# Patient Record
Sex: FEMALE | Race: WHITE
[De-identification: names, ages, dates, MRNs, and addresses within clinical notes are randomized per-mention and may not be internally consistent; named-entity substitution may affect disease eponyms.]

---

## 2021-02-25 VITALS — DIASTOLIC BLOOD PRESSURE: 86 MMHG | WEIGHT: 210 LBS | SYSTOLIC BLOOD PRESSURE: 146 MMHG | BODY MASS INDEX: 37.2 KG/M2

## 2021-10-08 PROBLEM — Z00.00 ENCOUNTER FOR PREVENTIVE HEALTH EXAMINATION: Status: ACTIVE | Noted: 2021-10-08

## 2021-10-21 ENCOUNTER — APPOINTMENT (OUTPATIENT)
Dept: BREAST CENTER | Facility: CLINIC | Age: 66
End: 2021-10-21
Payer: MEDICARE

## 2021-10-21 VITALS
WEIGHT: 190 LBS | DIASTOLIC BLOOD PRESSURE: 82 MMHG | BODY MASS INDEX: 33.66 KG/M2 | HEIGHT: 63 IN | TEMPERATURE: 97.1 F | SYSTOLIC BLOOD PRESSURE: 134 MMHG

## 2021-10-21 DIAGNOSIS — Z78.9 OTHER SPECIFIED HEALTH STATUS: ICD-10-CM

## 2021-10-21 PROCEDURE — 99203 OFFICE O/P NEW LOW 30 MIN: CPT

## 2021-10-21 NOTE — PAST MEDICAL HISTORY
[Menarche Age ____] : age at menarche was [unfilled] [Total Preg ___] : G[unfilled] [Live Births ___] : P[unfilled]  [Age At Live Birth ___] : Age at live birth: [unfilled] [FreeTextEntry7] : yes

## 2021-10-21 NOTE — HISTORY OF PRESENT ILLNESS
[FreeTextEntry1] : Joanna is 66 year old female here for evaluation of Left intraductal papilloma.\par \par At the time of imaging, she was experiencing right arm/UOQ pain; had a few episodes of clear nipple discharge.\par She has no new breast related complaints at this time.  She denies any breast pain, has not palpated any new palpable masses in either breast.\par \par Her imaging work-up was as follows:\par 2021 - B/L Dx Mammo & Sono:\par - Heterogeneously dense\par -oval focal asymmetry in the LEFT breast @ 6:00 axis measuring 8 mm. It correlates with nodule visualized on   ultrasound. \par -@6N1 hypoechoic nodule possibly associated with a duct in the LEFT breast measuring 8 x 5 x 5 mm. It    correlates with a focal asymmetry visualized mammographically-->Ultrasound-guided core biopsy \par BIRADS4\par \par 2021 - US Guided Core Bx:\par Left, 6:00 N1, 8mm: (cork).\par - *Intraductal papilloma* , fragments\par \par \par HISTORIC RISK FACTORS\par -no hx of breast surgery or previous bx\par -no fam hx of breast or ovarian cancer \par - age at first born 41\par - OCP use - 10 years.\par -pt worked at [employer] - 1 year post .\par \par

## 2021-10-21 NOTE — ASSESSMENT
[FreeTextEntry1] : Joanna is 66 year old female here for her new dx of intraductal papilloma \par \par On exam, I was not able to palpate any suspicious abnormalities but she did have clear nipple discharge from a left nipple duct @5:00.\par \par Her most recent imaging was a b/l dx mammogram and US on 7/14/2021 which revealed @6N1, a hypoechoic nodule measuring 8 x 5 x 5 mm which was her biopsy proven intraductal papilloma. \par \par Intraductal papillomas without atypia are considered fibroproliferative lesions without atypia.  Patients with these lesions were found to have a slightly increased relative risk of breast cancer compared to the reference population.  However the lesions themselves do not have any malignant potential. \par \par Although newer studies regarding the upgrade rate (to cancer) ranges from 0-14% on surgical excision, with pathologic/radiologic concordance, older studies found a higher upgrade rate.  I have recommended surgical excision for this reason.  Because it is not readily palpable, I will have her undergo a preoperative radiofrequency tag localization.  \par \par The risks and benefits of the procedure were explained to the patient, including bleeding, infection, seroma/hematoma formation, and possible re-operation if the surgical excision yields an upgrade to cancer with positive margins. Informed consent was obtained today.\par \par We discussed dense breasts.  Increasing breast density has been found to increase ones risk of breast cancer, but at this time, there is no clear indication for additional imaging in this setting, as both US and MRI have not been found to improve survival.  One can consider bilateral screening US.  However, out of 1000 women screened, the use of routine US will only identify an additional 3-5 cancers.  The use of US was found to increase the likelihood of undergoing more imaging and more biopsies.  She does have dense breasts.  We have decided to proceed with screening bilateral breast US at this time.  This will be scheduled with her next screening mammogram.\par \par She is otherwise at an average risk for breast cancer and should continue with annual screening mammograms and US. \par \par \par PLAN:\par -OR: LEFT BREAST WIDE LOCAL EXCISION WITH RF LOCALIZATION \par -DIAGNOSIS: LEFT INTRADUCTAL PAPILLOMA \par -f/up after

## 2021-10-21 NOTE — PHYSICAL EXAM
[No dominant masses] : no dominant masses in right breast  [No dominant masses] : no dominant masses left breast [No Nipple Retraction] : no left nipple retraction [No Nipple Discharge] : no right nipple discharge [No Axillary Lymphadenopathy] : no left axillary lymphadenopathy [Soft] : abdomen soft [Not Tender] : non-tender [No Edema] : no edema [No Rashes] : no rashes [No Ulceration] : no ulceration [de-identified] : no suspicious masses were palpated within either breast  [de-identified] : @5:00 nipple duct, I was able to express clear nipple discharge

## 2021-10-21 NOTE — REVIEW OF SYSTEMS
[As Noted in HPI] : as noted in HPI [Nipple Discharge] : nipple discharge [Negative] : Constitutional [Breast Pain] : no breast pain [Breast Lump] : no breast lump [Nipple Inverted] : no inversion of the nipple

## 2021-10-21 NOTE — DATA REVIEWED
[FreeTextEntry1] :  \par Jul 14, 2021  \par MAMMO TOMOSYNTHESIS DIAGNOSTIC BILATERAL, US BREAST COMPLETE BILATERAL\par \par  \par \par Clinical Breast Exam: Patient does report clinical breast exam in the last year.\par \par  \par \par Clinical Indication: Patient complains of focal right breast pain. Patient has clear nipple discharge from the left breast, not bloody. No family history of breast cancer.\par \par  \par \par  \par \par  \par \par MAMMOGRAM: \par \par  \par \par Tomosynthesis and 2D imaging of the breast(s) were performed.  Current study was also evaluated with a computer aided detection (CAD) system.\par \par  \par \par Breast Density: Heterogeneously dense, which may obscure small masses.\par \par  \par \par There is an oval focal asymmetry in the left breast at approximately 6:00 axis measuring 8 mm. It correlates with nodule visualized on ultrasound. No other significant masses, calcifications, or other findings are seen in either breast.\par \par  \par \par US BREAST COMPLETE BILATERAL\par \par  \par \par Ultrasound evaluation was performed including examination of all four quadrants of the breast(s) and the retroareolar regions.\par \par  \par \par Color flow, Gray scale and real-time ultrasound of both breasts was performed. \par \par  \par \par There is a hypoechoic nodule possibly associated with a duct in the left breast at 6:00 axis at 1 cm from the nipple measuring 8 x 5 x 5 mm. It correlates with a focal asymmetry visualized mammographically. Ultrasound-guided core biopsy is advised for further evaluation. No other findings were seen sonographically in either breast.\par \par  \par \par Electronic Signature: I personally reviewed the images and agree with this report. Final Report: Dictated by  and Signed by Attending Cecilia Banks MD 7/14/2021 3:03 PM\par \par  \par \par OVERALL IMPRESSION: \par \par  \par \par Left breast nodule.  Ultrasound guided core biopsy is advised.\par \par  \par \par Biopsy of the left breast(s) is recommended. A letter will be sent to the patient to return for a biopsy.\par \par  \par \par The findings and recommendations were discussed with the patient.\par \par  \par \par BI-RADS 4: SUSPICIOUS\par \par   \par \par Jul 22, 2021  3:29 [phone]  \par \par \par \par \par \par \par \par Addendum\par \par \par  \par \par \par Pathology Report Received From [facility]:\par \par  \par \par The pathology report of the left breast ultrasound-guided core biopsy dated 7/22/2021 indicated that the target at 6:00 axis is HIGH RISK \par \par  \par \par  \par \par Final Diagnosis \par \par A. Breast, left, 6 o'clock, 1 cm FN, ultrasound (US) guided biopsy: \par \par - *Intraductal papilloma* , fragments\par \par  \par \par  \par \par  \par \par The pathology results are concordant with the imaging findings.\par \par  \par \par The patient has been notified of these results on 7/28/2021 at 12:16 PM. She has been advised to contact your office and to arrange for SURGICAL CONSULTATION to discuss management options.\par \par  \par \par Surgical consultation of the left breast(s) is recommended. _\par \par  \par \par Electronic Signature: I personally reviewed the images and agree with this report. Final Report: Dictated by  and Signed by Attending Cecilia Banks MD 7/28/2021 12:19 PM \par \par \par Addended by Cecilia Villa MD on 7/28/2021 12:19 PM \par \par \par \par \par Study Result\par \par \par \par Addenda \par \par \par \par \par Pathology Report Received From [facility]:\par \par  \par \par The pathology report of the left breast ultrasound-guided core biopsy dated 7/22/2021 indicated that the target at 6:00 axis is HIGH RISK \par \par  \par \par  \par \par Final Diagnosis \par \par A. Breast, left, 6 o'clock, 1 cm FN, ultrasound (US) guided biopsy: \par \par - *Intraductal papilloma* , fragments\par \par  \par \par  \par \par  \par \par The pathology results are concordant with the imaging findings.\par \par  \par \par The patient has been notified of these results on 7/28/2021 at 12:16 PM. She has been advised to contact your office and to arrange for SURGICAL CONSULTATION to discuss management options.\par \par  \par \par Surgical consultation of the left breast(s) is recommended. _\par \par  \par \par Electronic Signature: I personally reviewed the images and agree with this report. Final Report: Dictated by  and Signed by Attending Cecilia Banks MD 7/28/2021 12:19 PM \par \par \par Signed by Cecilia Villa MD on 7/28/2021 12:19 PM \par \par \par Narrative & Impression \par \par \par \par \par US BREAST CORE BIOPSY LEFT, MAMMO DIGITAL POST PROCEDURE LEFT\par \par  \par \par HISTORY: Ultrasound of the left breast dated 7/14/2021 revealed a lesion at 6 o'clock which was recommended for biopsy.\par \par  \par \par Benefits, risks and alternatives to the procedure were explained to the patient and informed written consent was obtained. \par \par  \par \par The patient denied taking aspirin or anticoagulants and stated no allergies to topical antiseptic solutions or local anesthetic. \par \par Utilizing universal protocol, site and patient verification was performed prior to starting the procedure.\par \par PROCEDURE: After sterile skin preparation, local anesthesia was achieved with 1% lidocaine. Under ultrasound guidance, a 12G vacuum assisted needle biopsy device was used to obtain multiple core specimens. \par \par  \par \par After the procedure, a cork shaped marking clip was deployed in the area of sampling. \par \par  \par \par The patient tolerated the procedure well without immediate complications. \par \par  \par \par POST PROCEDURE MAMMOGRAM FOR MARKER PLACEMENT\par \par  \par \par A post-procedure mammogram was performed and demonstrates a clip in the appropriate location.\par \par  \par \par Electronic Signature: I personally reviewed the images and agree with this report. Final Report: Dictated by  and Signed by Attending Cecilia Banks MD 7/22/2021 3:29 PM\par \par  \par \par IMPRESSION: \par \par  \par \par Ultrasound guided needle biopsy of the left breast. Pathology pending.\par \par  \par \par A final report will be issued when Pathology results are available. _\par \par   \par

## 2021-10-22 ENCOUNTER — NON-APPOINTMENT (OUTPATIENT)
Age: 66
End: 2021-10-22

## 2021-10-28 ENCOUNTER — LABORATORY RESULT (OUTPATIENT)
Age: 66
End: 2021-10-28

## 2021-11-11 ENCOUNTER — OUTPATIENT (OUTPATIENT)
Dept: OUTPATIENT SERVICES | Facility: HOSPITAL | Age: 66
LOS: 1 days | Discharge: HOME | End: 2021-11-11

## 2021-11-11 DIAGNOSIS — Z02.9 ENCOUNTER FOR ADMINISTRATIVE EXAMINATIONS, UNSPECIFIED: ICD-10-CM

## 2021-11-12 ENCOUNTER — RESULT REVIEW (OUTPATIENT)
Age: 66
End: 2021-11-12

## 2021-11-12 ENCOUNTER — OUTPATIENT (OUTPATIENT)
Dept: OUTPATIENT SERVICES | Facility: HOSPITAL | Age: 66
LOS: 1 days | Discharge: HOME | End: 2021-11-12
Payer: MEDICARE

## 2021-11-12 VITALS
OXYGEN SATURATION: 97 % | TEMPERATURE: 98 F | DIASTOLIC BLOOD PRESSURE: 67 MMHG | WEIGHT: 205.03 LBS | RESPIRATION RATE: 16 BRPM | HEIGHT: 63 IN | HEART RATE: 80 BPM | SYSTOLIC BLOOD PRESSURE: 141 MMHG

## 2021-11-12 DIAGNOSIS — Z01.818 ENCOUNTER FOR OTHER PREPROCEDURAL EXAMINATION: ICD-10-CM

## 2021-11-12 DIAGNOSIS — D24.2 BENIGN NEOPLASM OF LEFT BREAST: ICD-10-CM

## 2021-11-12 LAB
ALBUMIN SERPL ELPH-MCNC: 4.6 G/DL — SIGNIFICANT CHANGE UP (ref 3.5–5.2)
ALP SERPL-CCNC: 101 U/L — SIGNIFICANT CHANGE UP (ref 30–115)
ALT FLD-CCNC: 15 U/L — SIGNIFICANT CHANGE UP (ref 0–41)
ANION GAP SERPL CALC-SCNC: 16 MMOL/L — HIGH (ref 7–14)
APTT BLD: 37.8 SEC — SIGNIFICANT CHANGE UP (ref 27–39.2)
AST SERPL-CCNC: 18 U/L — SIGNIFICANT CHANGE UP (ref 0–41)
BASOPHILS # BLD AUTO: 0.03 K/UL — SIGNIFICANT CHANGE UP (ref 0–0.2)
BASOPHILS NFR BLD AUTO: 0.4 % — SIGNIFICANT CHANGE UP (ref 0–1)
BILIRUB SERPL-MCNC: 0.4 MG/DL — SIGNIFICANT CHANGE UP (ref 0.2–1.2)
BUN SERPL-MCNC: 15 MG/DL — SIGNIFICANT CHANGE UP (ref 10–20)
CALCIUM SERPL-MCNC: 9.6 MG/DL — SIGNIFICANT CHANGE UP (ref 8.5–10.1)
CHLORIDE SERPL-SCNC: 103 MMOL/L — SIGNIFICANT CHANGE UP (ref 98–110)
CO2 SERPL-SCNC: 21 MMOL/L — SIGNIFICANT CHANGE UP (ref 17–32)
CREAT SERPL-MCNC: 0.9 MG/DL — SIGNIFICANT CHANGE UP (ref 0.7–1.5)
EOSINOPHIL # BLD AUTO: 0.06 K/UL — SIGNIFICANT CHANGE UP (ref 0–0.7)
EOSINOPHIL NFR BLD AUTO: 0.7 % — SIGNIFICANT CHANGE UP (ref 0–8)
GLUCOSE SERPL-MCNC: 117 MG/DL — HIGH (ref 70–99)
HCT VFR BLD CALC: 40.2 % — SIGNIFICANT CHANGE UP (ref 37–47)
HGB BLD-MCNC: 11.8 G/DL — LOW (ref 12–16)
IMM GRANULOCYTES NFR BLD AUTO: 0.2 % — SIGNIFICANT CHANGE UP (ref 0.1–0.3)
INR BLD: 1.06 RATIO — SIGNIFICANT CHANGE UP (ref 0.65–1.3)
LYMPHOCYTES # BLD AUTO: 1.48 K/UL — SIGNIFICANT CHANGE UP (ref 1.2–3.4)
LYMPHOCYTES # BLD AUTO: 18.2 % — LOW (ref 20.5–51.1)
MCHC RBC-ENTMCNC: 18.3 PG — LOW (ref 27–31)
MCHC RBC-ENTMCNC: 29.4 G/DL — LOW (ref 32–37)
MCV RBC AUTO: 62.3 FL — LOW (ref 81–99)
MONOCYTES # BLD AUTO: 0.29 K/UL — SIGNIFICANT CHANGE UP (ref 0.1–0.6)
MONOCYTES NFR BLD AUTO: 3.6 % — SIGNIFICANT CHANGE UP (ref 1.7–9.3)
NEUTROPHILS # BLD AUTO: 6.26 K/UL — SIGNIFICANT CHANGE UP (ref 1.4–6.5)
NEUTROPHILS NFR BLD AUTO: 76.9 % — HIGH (ref 42.2–75.2)
NRBC # BLD: 0 /100 WBCS — SIGNIFICANT CHANGE UP (ref 0–0)
PLATELET # BLD AUTO: 228 K/UL — SIGNIFICANT CHANGE UP (ref 130–400)
POTASSIUM SERPL-MCNC: 4.2 MMOL/L — SIGNIFICANT CHANGE UP (ref 3.5–5)
POTASSIUM SERPL-SCNC: 4.2 MMOL/L — SIGNIFICANT CHANGE UP (ref 3.5–5)
PROT SERPL-MCNC: 7.3 G/DL — SIGNIFICANT CHANGE UP (ref 6–8)
PROTHROM AB SERPL-ACNC: 12.2 SEC — SIGNIFICANT CHANGE UP (ref 9.95–12.87)
RBC # BLD: 6.45 M/UL — HIGH (ref 4.2–5.4)
RBC # FLD: 18.1 % — HIGH (ref 11.5–14.5)
SODIUM SERPL-SCNC: 140 MMOL/L — SIGNIFICANT CHANGE UP (ref 135–146)
WBC # BLD: 8.14 K/UL — SIGNIFICANT CHANGE UP (ref 4.8–10.8)
WBC # FLD AUTO: 8.14 K/UL — SIGNIFICANT CHANGE UP (ref 4.8–10.8)

## 2021-11-12 PROCEDURE — 93010 ELECTROCARDIOGRAM REPORT: CPT

## 2021-11-12 PROCEDURE — 71046 X-RAY EXAM CHEST 2 VIEWS: CPT | Mod: 26

## 2021-11-12 NOTE — H&P PST ADULT - HISTORY OF PRESENT ILLNESS
Joanna Hoff is a 67yo female with PMH of kidney stones and shingles who presents to PAST for the above procedure due to intraductal papilloma of the left breast and occational clear drainage from the left breast nipple.    Patient denies any cp, sob, palpitations, fever, cough, URI, abdominal pains, N/V, UTI, Rashes or open wounds.  As per patient exercise tolerance of 3 fos walks with out sob.  Patient denies any s/s covid 19 and reports no contact with known positive people. Patient has appointment for repeat covid testing pre op and instructed to continue to self monitor and report any concerns to MD. Pt will continue to practice self isolation and  exposure control measures pre op.  Anesthesia Alert  NO--Difficult Airway  NO--History of neck surgery or radiation  NO--Limited ROM of neck  NO--History of Malignant hyperthermia  NO--Personal or family history of Pseudocholinesterase deficiency  NO--Prior Anesthesia Complication  NO--Latex Allergy  NO--Loose teeth  NO--History of Rheumatoid Arthritis  NO--MICHELLE  NO--Bleeding Risk    Joanna Hoff is a 67yo female with PMH of kidney stones and shingles who presents to PAST for the above procedure due to intraductal papilloma of the left breast and occational clear drainage from the left breast nipple.    Patient denies any cp, sob, palpitations, fever, cough, URI, abdominal pains, N/V, UTI, Rashes or open wounds.  As per patient exercise tolerance of 3 fos walks with out sob.  Patient denies any s/s covid 19 and reports no contact with known positive people. Patient has appointment for repeat covid testing pre op and instructed to continue to self monitor and report any concerns to MD. Pt will continue to practice self isolation and  exposure control measures pre op.  Anesthesia Alert  NO--Difficult Airway  NO--History of neck surgery or radiation  NO--Limited ROM of neck  NO--History of Malignant hyperthermia  NO--Personal or family history of Pseudocholinesterase deficiency  NO--Prior Anesthesia Complication  NO--Latex Allergy  NO--Loose teeth. Top Front Teeth implants  NO--History of Rheumatoid Arthritis  NO--MICHELLE  NO--Bleeding Risk

## 2021-11-12 NOTE — H&P PST ADULT - REASON FOR ADMISSION
Case Type: OP Block TimeSuite: SATNAM   Proceduralist: Sara Yeon Kim  Confirmed Surgery Date Time: 12-   Procedure: LEFT BREAST WIDE LOCAL EXCISION WITH RADIOFREQUENCY LOCALIZER  Laterality: Left Length of Procedure: 90 Minutes  Anesthesia Type: Local Standby  Covid Testing 11/30/2021 0850

## 2021-11-12 NOTE — H&P PST ADULT - NSICDXFAMILYHX_GEN_ALL_CORE_FT
FAMILY HISTORY:  Father  Still living? No  FHx: lung cancer, Age at diagnosis: Age Unknown    Mother  Still living? Yes, Estimated age: Age Unknown  Family history of CHF (congestive heart failure), Age at diagnosis: Age Unknown

## 2021-11-16 ENCOUNTER — NON-APPOINTMENT (OUTPATIENT)
Age: 66
End: 2021-11-16

## 2021-11-30 ENCOUNTER — OUTPATIENT (OUTPATIENT)
Dept: OUTPATIENT SERVICES | Facility: HOSPITAL | Age: 66
LOS: 1 days | Discharge: HOME | End: 2021-11-30
Payer: MEDICARE

## 2021-11-30 ENCOUNTER — LABORATORY RESULT (OUTPATIENT)
Age: 66
End: 2021-11-30

## 2021-11-30 ENCOUNTER — RESULT REVIEW (OUTPATIENT)
Age: 66
End: 2021-11-30

## 2021-11-30 PROBLEM — Z86.19 PERSONAL HISTORY OF OTHER INFECTIOUS AND PARASITIC DISEASES: Chronic | Status: ACTIVE | Noted: 2021-11-12

## 2021-11-30 PROBLEM — N20.0 CALCULUS OF KIDNEY: Chronic | Status: ACTIVE | Noted: 2021-11-12

## 2021-11-30 PROCEDURE — 19285 PERQ DEV BREAST 1ST US IMAG: CPT | Mod: LT

## 2021-11-30 PROCEDURE — 77065 DX MAMMO INCL CAD UNI: CPT | Mod: 26,LT

## 2021-12-03 ENCOUNTER — OUTPATIENT (OUTPATIENT)
Dept: OUTPATIENT SERVICES | Facility: HOSPITAL | Age: 66
LOS: 1 days | Discharge: HOME | End: 2021-12-03
Payer: MEDICARE

## 2021-12-03 ENCOUNTER — APPOINTMENT (OUTPATIENT)
Dept: BREAST CENTER | Facility: AMBULATORY SURGERY CENTER | Age: 66
End: 2021-12-03
Payer: MEDICARE

## 2021-12-03 ENCOUNTER — RESULT REVIEW (OUTPATIENT)
Age: 66
End: 2021-12-03

## 2021-12-03 VITALS
DIASTOLIC BLOOD PRESSURE: 72 MMHG | HEART RATE: 70 BPM | OXYGEN SATURATION: 96 % | SYSTOLIC BLOOD PRESSURE: 146 MMHG | RESPIRATION RATE: 20 BRPM

## 2021-12-03 VITALS
HEART RATE: 72 BPM | OXYGEN SATURATION: 99 % | RESPIRATION RATE: 18 BRPM | WEIGHT: 205.03 LBS | HEIGHT: 63 IN | TEMPERATURE: 98 F | DIASTOLIC BLOOD PRESSURE: 79 MMHG | SYSTOLIC BLOOD PRESSURE: 190 MMHG

## 2021-12-03 PROCEDURE — 88305 TISSUE EXAM BY PATHOLOGIST: CPT | Mod: 26

## 2021-12-03 PROCEDURE — 19125 EXCISION BREAST LESION: CPT | Mod: LT

## 2021-12-03 RX ORDER — HYDROMORPHONE HYDROCHLORIDE 2 MG/ML
0.5 INJECTION INTRAMUSCULAR; INTRAVENOUS; SUBCUTANEOUS
Refills: 0 | Status: DISCONTINUED | OUTPATIENT
Start: 2021-12-03 | End: 2021-12-03

## 2021-12-03 RX ORDER — IBUPROFEN 200 MG
1 TABLET ORAL
Qty: 16 | Refills: 0
Start: 2021-12-03 | End: 2021-12-06

## 2021-12-03 RX ORDER — MEPERIDINE HYDROCHLORIDE 50 MG/ML
12.5 INJECTION INTRAMUSCULAR; INTRAVENOUS; SUBCUTANEOUS ONCE
Refills: 0 | Status: DISCONTINUED | OUTPATIENT
Start: 2021-12-03 | End: 2021-12-03

## 2021-12-03 RX ORDER — HYDROMORPHONE HYDROCHLORIDE 2 MG/ML
1 INJECTION INTRAMUSCULAR; INTRAVENOUS; SUBCUTANEOUS
Refills: 0 | Status: DISCONTINUED | OUTPATIENT
Start: 2021-12-03 | End: 2021-12-03

## 2021-12-03 RX ORDER — SODIUM CHLORIDE 9 MG/ML
1000 INJECTION, SOLUTION INTRAVENOUS
Refills: 0 | Status: DISCONTINUED | OUTPATIENT
Start: 2021-12-03 | End: 2021-12-17

## 2021-12-03 RX ORDER — ACETAMINOPHEN 500 MG
650 TABLET ORAL ONCE
Refills: 0 | Status: DISCONTINUED | OUTPATIENT
Start: 2021-12-03 | End: 2021-12-17

## 2021-12-03 RX ORDER — ONDANSETRON 8 MG/1
4 TABLET, FILM COATED ORAL ONCE
Refills: 0 | Status: DISCONTINUED | OUTPATIENT
Start: 2021-12-03 | End: 2021-12-17

## 2021-12-03 RX ORDER — OXYCODONE HYDROCHLORIDE 5 MG/1
5 TABLET ORAL ONCE
Refills: 0 | Status: DISCONTINUED | OUTPATIENT
Start: 2021-12-03 | End: 2021-12-03

## 2021-12-03 RX ADMIN — SODIUM CHLORIDE 100 MILLILITER(S): 9 INJECTION, SOLUTION INTRAVENOUS at 08:42

## 2021-12-03 NOTE — PRE-ANESTHESIA EVALUATION ADULT - NSANTHADDINFOFT_GEN_ALL_CORE
risks, benefits, alternatives, general anesthesia as a backup discussed with the patient and she agrees to proceed as planned, pt seen and examined prior to transport to OR

## 2021-12-03 NOTE — ASU DISCHARGE PLAN (ADULT/PEDIATRIC) - CARE PROVIDER_API CALL
Taylor Hernandez (MD)  Surgery  [address] [facility], [address] [place] [address]  Phone: [phone]  Fax: [phone]  Follow Up Time:

## 2021-12-03 NOTE — ASU DISCHARGE PLAN (ADULT/PEDIATRIC) - ASU DC SPECIAL INSTRUCTIONSFT
You are being discharged from [facility]. Please follow up at your scheduled appointment with Dr. Hernandez on 12/14/21 at 10:45am. You have been prescribed pain medications, please take as needed. Please wear sports bras as often as you can until your follow up appointment. You may shower in 24hrs. Please avoid heavy weight lifting for the next 4 weeks. If you have any further questions about your care, please do not hesitate to contact Dr. Hernandez's office or return to the Emergency Department.

## 2021-12-03 NOTE — ASU DISCHARGE PLAN (ADULT/PEDIATRIC) - NS MD DC FALL RISK RISK
For information on Fall & Injury Prevention, visit: [email or web address] OR  [email or web address] OR  https://www.cdc.gov/steadi/patient.html

## 2021-12-03 NOTE — ASU DISCHARGE PLAN (ADULT/PEDIATRIC) - FOLLOW UP APPOINTMENTS
[facility] ED/[facility]:  [facility]

## 2021-12-03 NOTE — CHART NOTE - NSCHARTNOTEFT_GEN_A_CORE
PACU ANESTHESIA ADMISSION NOTE      Procedure: Left breast lumpectomy      Post op diagnosis:  Intraductal papilloma of left breast        ____  Intubated  TV:______       Rate: ______      FiO2: ______    _x___  Patent Airway    _x___  Full return of protective reflexes    _x___  Full recovery from anesthesia / back to baseline status    Vitals:            T:  97.5              BP :  130/60              R: 19             Sat:97              P:85      Mental Status:  _x___ Awake   _____ Alert   _____ Drowsy   _____ Sedated    Nausea/Vomiting:  _x___  NO       ______Yes,   See Post - Op Orders         Pain Scale (0-10):  __0___    Treatment: _x___ None    ____ See Post - Op/PCA Orders    Post - Operative Fluids:   __x__ Oral   ____ See Post - Op Orders    Plan: Discharge:   _x___Home       _____Floor     _____Critical Care    _____  Other:_________________    Comments:  No anesthesia issues or complications noted.  Discharge when criteria met.

## 2021-12-07 DIAGNOSIS — D24.2 BENIGN NEOPLASM OF LEFT BREAST: ICD-10-CM

## 2021-12-07 LAB — SURGICAL PATHOLOGY STUDY: SIGNIFICANT CHANGE UP

## 2021-12-08 DIAGNOSIS — Z88.2 ALLERGY STATUS TO SULFONAMIDES: ICD-10-CM

## 2021-12-08 DIAGNOSIS — D24.2 BENIGN NEOPLASM OF LEFT BREAST: ICD-10-CM

## 2021-12-08 DIAGNOSIS — N60.42 MAMMARY DUCT ECTASIA OF LEFT BREAST: ICD-10-CM

## 2021-12-13 PROBLEM — D24.2 INTRADUCTAL PAPILLOMA OF LEFT BREAST: Status: ACTIVE | Noted: 2021-10-21

## 2021-12-14 ENCOUNTER — APPOINTMENT (OUTPATIENT)
Dept: BREAST CENTER | Facility: CLINIC | Age: 66
End: 2021-12-14
Payer: MEDICARE

## 2021-12-14 VITALS
WEIGHT: 190 LBS | DIASTOLIC BLOOD PRESSURE: 90 MMHG | HEIGHT: 63 IN | BODY MASS INDEX: 33.66 KG/M2 | SYSTOLIC BLOOD PRESSURE: 165 MMHG | TEMPERATURE: 97.3 F

## 2021-12-14 DIAGNOSIS — D24.2 BENIGN NEOPLASM OF LEFT BREAST: ICD-10-CM

## 2021-12-14 PROCEDURE — 99024 POSTOP FOLLOW-UP VISIT: CPT

## 2021-12-14 NOTE — DATA REVIEWED
[FreeTextEntry1] : Surgical Pathology Report - Auth (Verified)\par \par Specimen(s) Submitted\par Left breast mass\par Time obtained: 7:55 am\par \par Final Diagnosis\par Breast, left retroareolar mass, radio frequency seed localized lumpectomy:\par - Partially sclerosed intraductal papilloma, 5.0 mm (microscopic\par measurement), located adjacent to remote prior biopsy site changes.\par \par - Benign atrophic fatty retroareolar breast tissue with mildly\par proliferative type fibrocystic changes and focal mammary duct ectasia

## 2021-12-14 NOTE — REVIEW OF SYSTEMS
[Fever] : no fever [Chills] : no chills [Breast Pain] : no breast pain [Breast Swelling] : no breast swelling [Breast Reddening] : no reddening of the breast [Negative] : Constitutional

## 2021-12-14 NOTE — HISTORY OF PRESENT ILLNESS
[FreeTextEntry1] : Alina is 66 year old female here for evaluation of Left intraductal papilloma.\par \par At the time of imaging, she was experiencing right arm/UOQ pain; had a few episodes of clear nipple discharge.\par She has no new breast related complaints at this time.  She denies any breast pain, has not palpated any new palpable masses in either breast.\par \par Her imaging work-up was as follows:\par 2021 - B/L Dx Mammo & Sono:\par - Heterogeneously dense\par -oval focal asymmetry in the LEFT breast @ 6:00 axis measuring 8 mm. It correlates with nodule visualized on   ultrasound. \par -@6N1 hypoechoic nodule possibly associated with a duct in the LEFT breast measuring 8 x 5 x 5 mm. It    correlates with a focal asymmetry visualized mammographically-->Ultrasound-guided core biopsy \par BIRADS4\par \par 2021 - US Guided Core Bx:\par Left, 6:00 N1, 8mm: (cork).\par - *Intraductal papilloma* , fragments\par \par \par HISTORIC RISK FACTORS\par -no hx of breast surgery or previous bx\par -no fam hx of breast or ovarian cancer \par - age at first born 41\par - OCP use - 10 years.\par -pt worked at [employer] - 1 year post .\par \par INTERVAL HISTORY:\par 2021 --\par ALINA WYNNE is a 66 year old female patient who presents today to the office for her initial post-operative visit.\par She is s/p Left WLE with RF loc - 2021.\par She is feeling well.\par She denies any fever / chills or erythema and / or drainage related to the incision.\par Her pain is well controlled, only complains of mild soreness of the area.\par \par Final pathology reveals partially sclerosed intraductal papilloma, 5.0 mm (microscopic measurement), located adjacent to remote prior biopsy site changes.\par \par \par \par

## 2021-12-14 NOTE — ASSESSMENT
[FreeTextEntry1] : ALINA WYNNE is a 66 year old female patient who presents today to the office for her initial post-operative visit.\par She is s/p Left WLE with RF loc - 12/03/2021.\par She is feeling well.\par She denies any fever / chills or erythema and / or drainage related to the incision.\par Her pain is well controlled, only complains of mild soreness of the area.\par \par Final pathology reveals partially sclerosed intraductal papilloma, 5.0 mm (microscopic measurement), located adjacent to remote prior biopsy site changes.\par \par On exam, incision is healing well. no erythema noted. no drainage noted.\par \par \par AS REVIEW:\par Intraductal papillomas without atypia are considered fibroproliferative lesions without atypia.  Patients with these lesions were found to have a slightly increased relative risk of breast cancer compared to the reference population.  However the lesions themselves do not have any malignant potential. \par \par Although newer studies regarding the upgrade rate (to cancer) ranges from 0-14% on surgical excision, with pathologic/radiologic concordance, older studies found a higher upgrade rate.  I have recommended surgical excision for this reason.  Because it is not readily palpable, I will have her undergo a preoperative radiofrequency tag localization.  \par \par The risks and benefits of the procedure were explained to the patient, including bleeding, infection, seroma/hematoma formation, and possible re-operation if the surgical excision yields an upgrade to cancer with positive margins. Informed consent was obtained today.\par \par We discussed dense breasts.  Increasing breast density has been found to increase ones risk of breast cancer, but at this time, there is no clear indication for additional imaging in this setting, as both US and MRI have not been found to improve survival.  One can consider bilateral screening US.  However, out of 1000 women screened, the use of routine US will only identify an additional 3-5 cancers.  The use of US was found to increase the likelihood of undergoing more imaging and more biopsies.  She does have dense breasts.  We have decided to proceed with screening bilateral breast US at this time.  This will be scheduled with her next screening mammogram.\par \par She is otherwise at an average risk for breast cancer and should continue with annual screening mammograms and US. \par \par \par PLAN:\par -DIAGNOSIS: LEFT INTRADUCTAL PAPILLOMA \par -B/L Dx Mammo & Sono - July 2022.\par -f/up after

## 2022-07-14 ENCOUNTER — RESULT REVIEW (OUTPATIENT)
Age: 67
End: 2022-07-14

## 2022-07-14 ENCOUNTER — OUTPATIENT (OUTPATIENT)
Dept: OUTPATIENT SERVICES | Facility: HOSPITAL | Age: 67
LOS: 1 days | Discharge: HOME | End: 2022-07-14

## 2022-07-14 DIAGNOSIS — R92.8 OTHER ABNORMAL AND INCONCLUSIVE FINDINGS ON DIAGNOSTIC IMAGING OF BREAST: ICD-10-CM

## 2022-07-14 PROCEDURE — 77066 DX MAMMO INCL CAD BI: CPT | Mod: 26

## 2022-07-14 PROCEDURE — G0279: CPT | Mod: 26

## 2022-07-14 PROCEDURE — 76641 ULTRASOUND BREAST COMPLETE: CPT | Mod: 26,50

## 2022-07-26 ENCOUNTER — APPOINTMENT (OUTPATIENT)
Dept: BREAST CENTER | Facility: CLINIC | Age: 67
End: 2022-07-26

## 2022-09-29 ENCOUNTER — APPOINTMENT (OUTPATIENT)
Dept: OBGYN | Facility: CLINIC | Age: 67
End: 2022-09-29

## 2022-09-29 VITALS
HEART RATE: 83 BPM | DIASTOLIC BLOOD PRESSURE: 81 MMHG | WEIGHT: 200 LBS | SYSTOLIC BLOOD PRESSURE: 170 MMHG | BODY MASS INDEX: 35.43 KG/M2

## 2022-09-29 DIAGNOSIS — Z01.419 ENCOUNTER FOR GYNECOLOGICAL EXAMINATION (GENERAL) (ROUTINE) W/OUT ABNORMAL FINDINGS: ICD-10-CM

## 2022-09-29 DIAGNOSIS — N81.6 RECTOCELE: ICD-10-CM

## 2022-09-29 DIAGNOSIS — B97.7 PAPILLOMAVIRUS AS THE CAUSE OF DISEASES CLASSIFIED ELSEWHERE: ICD-10-CM

## 2022-09-29 LAB
BILIRUB UR QL STRIP: NORMAL
CLARITY UR: CLEAR
COLLECTION METHOD: NORMAL
GLUCOSE UR-MCNC: NORMAL
HCG UR QL: 0.2 EU/DL
HGB UR QL STRIP.AUTO: NORMAL
KETONES UR-MCNC: NORMAL
LEUKOCYTE ESTERASE UR QL STRIP: NORMAL
NITRITE UR QL STRIP: NORMAL
PH UR STRIP: 5
PROT UR STRIP-MCNC: NORMAL
SP GR UR STRIP: 1.02

## 2022-09-29 PROCEDURE — G0101: CPT

## 2022-09-29 PROCEDURE — 81003 URINALYSIS AUTO W/O SCOPE: CPT | Mod: QW

## 2022-09-29 NOTE — PHYSICAL EXAM
[Examination Of The Breasts] : a normal appearance [No Masses] : no breast masses were palpable [Labia Majora] : normal [Labia Minora] : normal [Normal] : normal [Uterine Adnexae] : normal [FreeTextEntry9] : large rectocele noted at the introitus at rest

## 2022-09-29 NOTE — PLAN
[FreeTextEntry1] : patient referred to Dr Tatum for evaluation of rectocele. Also history of +hpv dna.

## 2022-10-04 LAB — HPV HIGH+LOW RISK DNA PNL CVX: NOT DETECTED

## 2022-10-10 LAB — CYTOLOGY CVX/VAG DOC THIN PREP: ABNORMAL

## 2022-12-20 DIAGNOSIS — Z78.0 ASYMPTOMATIC MENOPAUSAL STATE: ICD-10-CM

## 2022-12-20 DIAGNOSIS — N64.4 MASTODYNIA: ICD-10-CM

## 2022-12-20 DIAGNOSIS — Z84.2 FAMILY HISTORY OF OTHER DISEASES OF THE GENITOURINARY SYSTEM: ICD-10-CM

## 2022-12-20 DIAGNOSIS — Z92.89 PERSONAL HISTORY OF OTHER MEDICAL TREATMENT: ICD-10-CM

## 2022-12-20 DIAGNOSIS — Z82.49 FAMILY HISTORY OF ISCHEMIC HEART DISEASE AND OTHER DISEASES OF THE CIRCULATORY SYSTEM: ICD-10-CM

## 2022-12-20 DIAGNOSIS — Z78.9 OTHER SPECIFIED HEALTH STATUS: ICD-10-CM

## 2022-12-20 DIAGNOSIS — Z82.3 FAMILY HISTORY OF STROKE: ICD-10-CM

## 2023-08-09 DIAGNOSIS — Z12.31 ENCOUNTER FOR SCREENING MAMMOGRAM FOR MALIGNANT NEOPLASM OF BREAST: ICD-10-CM

## 2023-09-13 ENCOUNTER — RESULT REVIEW (OUTPATIENT)
Age: 68
End: 2023-09-13

## 2023-09-13 ENCOUNTER — OUTPATIENT (OUTPATIENT)
Dept: OUTPATIENT SERVICES | Facility: HOSPITAL | Age: 68
LOS: 1 days | End: 2023-09-13
Payer: MEDICARE

## 2023-09-13 DIAGNOSIS — Z12.31 ENCOUNTER FOR SCREENING MAMMOGRAM FOR MALIGNANT NEOPLASM OF BREAST: ICD-10-CM

## 2023-09-13 PROCEDURE — 77067 SCR MAMMO BI INCL CAD: CPT | Mod: 26

## 2023-09-13 PROCEDURE — 76641 ULTRASOUND BREAST COMPLETE: CPT | Mod: 26,50

## 2023-09-13 PROCEDURE — 77067 SCR MAMMO BI INCL CAD: CPT

## 2023-09-13 PROCEDURE — 77063 BREAST TOMOSYNTHESIS BI: CPT

## 2023-09-13 PROCEDURE — 77063 BREAST TOMOSYNTHESIS BI: CPT | Mod: 26

## 2023-09-13 PROCEDURE — 76641 ULTRASOUND BREAST COMPLETE: CPT | Mod: 50

## 2023-09-14 DIAGNOSIS — Z12.31 ENCOUNTER FOR SCREENING MAMMOGRAM FOR MALIGNANT NEOPLASM OF BREAST: ICD-10-CM

## 2024-09-10 ENCOUNTER — APPOINTMENT (OUTPATIENT)
Dept: BREAST CENTER | Facility: CLINIC | Age: 69
End: 2024-09-10
Payer: MEDICARE

## 2024-09-10 DIAGNOSIS — N64.4 MASTODYNIA: ICD-10-CM

## 2024-09-10 DIAGNOSIS — L30.4 ERYTHEMA INTERTRIGO: ICD-10-CM

## 2024-09-10 DIAGNOSIS — D24.2 BENIGN NEOPLASM OF LEFT BREAST: ICD-10-CM

## 2024-09-10 PROCEDURE — 99213 OFFICE O/P EST LOW 20 MIN: CPT

## 2024-09-10 RX ORDER — CLOTRIMAZOLE AND BETAMETHASONE DIPROPIONATE 10; .5 MG/G; MG/G
1-0.05 CREAM TOPICAL TWICE DAILY
Qty: 1 | Refills: 2 | Status: ACTIVE | COMMUNITY
Start: 2024-09-10 | End: 1900-01-01

## 2024-09-10 NOTE — HISTORY OF PRESENT ILLNESS
[FreeTextEntry1] : Alina is 66 year old female here for evaluation of Left intraductal papilloma.  At the time of imaging, she was experiencing right arm/UOQ pain; had a few episodes of clear nipple discharge. She has no new breast related complaints at this time.  She denies any breast pain, has not palpated any new palpable masses in either breast.  Her imaging work-up was as follows: 2021 - B/L Dx Mammo & Sono: - Heterogeneously dense -oval focal asymmetry in the LEFT breast @ 6:00 axis measuring 8 mm. It correlates with nodule visualized on   ultrasound.  -@6N1 hypoechoic nodule possibly associated with a duct in the LEFT breast measuring 8 x 5 x 5 mm. It    correlates with a focal asymmetry visualized mammographically-->Ultrasound-guided core biopsy  BIRADS4  2021 - US Guided Core Bx: Left, 6:00 N1, 8mm: (cork). - *Intraductal papilloma* , fragments   HISTORIC RISK FACTORS -no hx of breast surgery or previous bx -no fam hx of breast or ovarian cancer  - age at first born 41 - OCP use - 10 years. -pt worked at [employer] - 1 year post .  INTERVAL HISTORY: 2021 -- ALINA WYNNE is a 66 year old female patient who presents today to the office for her initial post-operative visit. She is s/p Left WLE with RF loc - 2021. She is feeling well. She denies any fever / chills or erythema and / or drainage related to the incision. Her pain is well controlled, only complains of mild soreness of the area.  Final pathology reveals partially sclerosed intraductal papilloma, 5.0 mm (microscopic measurement), located adjacent to remote prior biopsy site changes.  INTERVAL HISTORY 9/10/24 Alina is here for her follow up visit   She states she has new right breast pain and discomfort but denies any other breast related complaints  Her imaging is as follows: 2023 b/l mammo and US -->birads2 scattered areas of fibroglandular density  stable asymmetries seen in both breasts.

## 2024-09-10 NOTE — HISTORY OF PRESENT ILLNESS
[FreeTextEntry1] : Alina is 66 year old female here for evaluation of Left intraductal papilloma.  At the time of imaging, she was experiencing right arm/UOQ pain; had a few episodes of clear nipple discharge. She has no new breast related complaints at this time.  She denies any breast pain, has not palpated any new palpable masses in either breast.  Her imaging work-up was as follows: 2021 - B/L Dx Mammo & Sono: - Heterogeneously dense -oval focal asymmetry in the LEFT breast @ 6:00 axis measuring 8 mm. It correlates with nodule visualized on   ultrasound.  -@6N1 hypoechoic nodule possibly associated with a duct in the LEFT breast measuring 8 x 5 x 5 mm. It    correlates with a focal asymmetry visualized mammographically-->Ultrasound-guided core biopsy  BIRADS4  2021 - US Guided Core Bx: Left, 6:00 N1, 8mm: (cork). - *Intraductal papilloma* , fragments   HISTORIC RISK FACTORS -no hx of breast surgery or previous bx -no fam hx of breast or ovarian cancer  - age at first born 41 - OCP use - 10 years. -pt worked at [employer] - 1 year post .  INTERVAL HISTORY: 2021 -- ALINA WYNNE is a 66 year old female patient who presents today to the office for her initial post-operative visit. She is s/p Left WLE with RF loc - 2021. She is feeling well. She denies any fever / chills or erythema and / or drainage related to the incision. Her pain is well controlled, only complains of mild soreness of the area.  Final pathology reveals partially sclerosed intraductal papilloma, 5.0 mm (microscopic measurement), located adjacent to remote prior biopsy site changes.  INTERVAL HISTORY 9/10/24 Alina is here for her follow up visit   She states she has new right breast pain and discomfort but denies any other breast related complaints  Her imaging is as follows: 2023 b/l mammo and US -->birads2 scattered areas of fibroglandular density  stable asymmetries seen in both breasts.

## 2024-09-10 NOTE — PHYSICAL EXAM
[Normocephalic] : normocephalic [Atraumatic] : atraumatic [EOMI] : extra ocular movement intact [Examined in the supine and seated position] : examined in the supine and seated position [Symmetrical] : symmetrical [No dominant masses] : no dominant masses in right breast  [No dominant masses] : no dominant masses left breast [No Nipple Retraction] : no left nipple retraction [No Nipple Discharge] : no left nipple discharge [No Axillary Lymphadenopathy] : no left axillary lymphadenopathy [No Edema] : no edema [No Rashes] : no rashes [No Ulceration] : no ulceration [de-identified] : On physical exam, there are no discrete masses in either breast or axilla. There is no nipple discharge or inversion bilaterally. There are no skin changes bilaterally.  [de-identified] : bilateral erythema in inframammary fold-->intertrigo

## 2024-09-10 NOTE — DATA REVIEWED
[FreeTextEntry1] : [id number]     EXAM:  MG MAMMO SCREEN W ANJALI BI#   ORDERED BY: ATIF PALMER  PROCEDURE DATE:  09/13/2023    INTERPRETATION:  HISTORY: Bilateral MG MAMMO SCREEN W ANJALI BI#, Bilateral US BREAST COMPLETE BI was performed. Patient is 67 years old and is seen for screening. The patient has no personal history of cancer.  The patient has no family history of breast cancer.  RISK ASSESSMENT: NCI Lifetime Risk: 4.0 Tyrer-Cuzick Lifetime Risk: 3.3  CLINICAL BREAST EXAM: The patient reports their last clinical breast exam was performed within the past year.  COMPARISON STUDIES: The present examination has been compared to prior imaging studies performed at [facility] on 07/14/2021 and 07/22/2021, and at [facility] on 11/30/2021 and 07/14/2022.  MAMMOGRAM FINDINGS: Mammography was performed including the following views: bilateral craniocaudal with tomosynthesis, bilateral mediolateral oblique with tomosynthesis.  The examination includes digital synthetic 2D and digital tomosynthesis 3D images. Additional imaging analysis was performed using CAD (computer-aided detection) software.  There are scattered areas of fibroglandular density.  Finding 1:  There are stable asymmetries seen in both breasts.  ULTRASOUND FINDINGS: Finding 2:  Bilateral whole breast ultrasound was performed. There is no evidence of any solid mass or abnormal cystic elements.  IMPRESSION: Finding 1:  Stable asymmetries in both breasts are benign finding.  Finding 2:  There is no evidence of malignancy.  RECOMMENDATION: Unless otherwise indicated by clinical findings, annual screening mammography recommended.  ASSESSMENT: BI-RADS Category 2:  Benign  [id number]     EXAM:  MG US BREAST COMPLETE BI   ORDERED BY: ATIF PALMER  PROCEDURE DATE:  09/13/2023    INTERPRETATION:  HISTORY: Bilateral MG MAMMO SCREEN W ANJALI BI#, Bilateral US BREAST COMPLETE BI was performed. Patient is 67 years old and is seen for screening. The patient has no personal history of cancer.  The patient has no family history of breast cancer.  RISK ASSESSMENT: NCI Lifetime Risk: 4.0 Tyrer-Cuzick Lifetime Risk: 3.3  CLINICAL BREAST EXAM: The patient reports their last clinical breast exam was performed within the past year.  COMPARISON STUDIES: The present examination has been compared to prior imaging studies performed at [facility] on 07/14/2021 and 07/22/2021, and at [facility] on 11/30/2021 and 07/14/2022.  MAMMOGRAM FINDINGS: Mammography was performed including the following views: bilateral craniocaudal with tomosynthesis, bilateral mediolateral oblique with tomosynthesis.  The examination includes digital synthetic 2D and digital tomosynthesis 3D images. Additional imaging analysis was performed using CAD (computer-aided detection) software.  There are scattered areas of fibroglandular density.  Finding 1:  There are stable asymmetries seen in both breasts.  ULTRASOUND FINDINGS: Finding 2:  Bilateral whole breast ultrasound was performed. There is no evidence of any solid mass or abnormal cystic elements.  IMPRESSION: Finding 1:  Stable asymmetries in both breasts are benign finding.  Finding 2:  There is no evidence of malignancy.  RECOMMENDATION: Unless otherwise indicated by clinical findings, annual screening mammography recommended.  ASSESSMENT: BI-RADS Category 2:  Benign

## 2024-09-10 NOTE — PHYSICAL EXAM
[Normocephalic] : normocephalic [Atraumatic] : atraumatic [EOMI] : extra ocular movement intact [Examined in the supine and seated position] : examined in the supine and seated position [Symmetrical] : symmetrical [No dominant masses] : no dominant masses in right breast  [No dominant masses] : no dominant masses left breast [No Nipple Retraction] : no left nipple retraction [No Nipple Discharge] : no left nipple discharge [No Axillary Lymphadenopathy] : no left axillary lymphadenopathy [No Edema] : no edema [No Rashes] : no rashes [No Ulceration] : no ulceration [de-identified] : On physical exam, there are no discrete masses in either breast or axilla. There is no nipple discharge or inversion bilaterally. There are no skin changes bilaterally.  [de-identified] : bilateral erythema in inframammary fold-->intertrigo

## 2024-09-10 NOTE — ASSESSMENT
[FreeTextEntry1] : ALINA WYNNE is a 68 year old female patient  s/p Left WLE with RF loc - 12/03/2021 for left breast IP   On physical exam, there are no discrete masses in either breast or axilla. There is no nipple discharge or inversion bilaterally. There are no skin changes bilaterally, bilateral erythema in inframammary fold-->intertrigo    Her imaging is as follows: 09/13/2023 b/l mammo and US -->birads2 scattered areas of fibroglandular density  stable asymmetries seen in both breasts.  In regards to her breast pain, it may be related to fibrocystic changes within her breast that are hormonally influenced. We spoke about possible interventions including evening primrose oil, supportive bras, and decreasing caffeine intake.  Although none of these have been consistently proven to improve breast pain, they may be tried.  Due to new sx of breast pain she will be due for bl dx mammo and US now.    AS REVIEW: Intraductal papillomas without atypia are considered fibroproliferative lesions without atypia.  Patients with these lesions were found to have a slightly increased relative risk of breast cancer compared to the reference population.  However the lesions themselves do not have any malignant potential.   Although newer studies regarding the upgrade rate (to cancer) ranges from 0-14% on surgical excision, with pathologic/radiologic concordance, older studies found a higher upgrade rate.  I have recommended surgical excision for this reason.  Because it is not readily palpable, I will have her undergo a preoperative radiofrequency tag localization.    The risks and benefits of the procedure were explained to the patient, including bleeding, infection, seroma/hematoma formation, and possible re-operation if the surgical excision yields an upgrade to cancer with positive margins. Informed consent was obtained today.   She is otherwise at an average risk for breast cancer and should continue with annual screening mammograms   PLAN: -DIAGNOSIS: LEFT INTRADUCTAL PAPILLOMA  -intertrigo -->clotrimazole, keep the area clean and dry  -B/L dx  Mammo- now (we will call her with results) -if above is unrevealing she will be due for bl mammo in one year -f/up after above imaging for clinical breast exam pending interval changes   I spent a total of 25 minutes of face to face time with this patient, greater than 50% of which was spent in counseling and/or coordination of care. All of her questions were appropriately answered.

## 2024-09-10 NOTE — DATA REVIEWED
[FreeTextEntry1] : [id number]     EXAM:  MG MAMMO SCREEN W ANJALI BI#   ORDERED BY: ATIF PALMER  PROCEDURE DATE:  09/13/2023    INTERPRETATION:  HISTORY: Bilateral MG MAMMO SCREEN W ANJALI BI#, Bilateral US BREAST COMPLETE BI was performed. Patient is 67 years old and is seen for screening. The patient has no personal history of cancer.  The patient has no family history of breast cancer.  RISK ASSESSMENT: NCI Lifetime Risk: 4.0 Tyrer-Cuzick Lifetime Risk: 3.3  CLINICAL BREAST EXAM: The patient reports their last clinical breast exam was performed within the past year.  COMPARISON STUDIES: The present examination has been compared to prior imaging studies performed at [facility] on 07/14/2021 and 07/22/2021, and at [facility] on 11/30/2021 and 07/14/2022.  MAMMOGRAM FINDINGS: Mammography was performed including the following views: bilateral craniocaudal with tomosynthesis, bilateral mediolateral oblique with tomosynthesis.  The examination includes digital synthetic 2D and digital tomosynthesis 3D images. Additional imaging analysis was performed using CAD (computer-aided detection) software.  There are scattered areas of fibroglandular density.  Finding 1:  There are stable asymmetries seen in both breasts.  ULTRASOUND FINDINGS: Finding 2:  Bilateral whole breast ultrasound was performed. There is no evidence of any solid mass or abnormal cystic elements.  IMPRESSION: Finding 1:  Stable asymmetries in both breasts are benign finding.  Finding 2:  There is no evidence of malignancy.  RECOMMENDATION: Unless otherwise indicated by clinical findings, annual screening mammography recommended.  ASSESSMENT: BI-RADS Category 2:  Benign  [id number]     EXAM:  MG US BREAST COMPLETE BI   ORDERED BY: ATIF PALMER  PROCEDURE DATE:  09/13/2023    INTERPRETATION:  HISTORY: Bilateral MG MAMMO SCREEN W ANJALI BI#, Bilateral US BREAST COMPLETE BI was performed. Patient is 67 years old and is seen for screening. The patient has no personal history of cancer.  The patient has no family history of breast cancer.  RISK ASSESSMENT: NCI Lifetime Risk: 4.0 Tyrer-Cuzick Lifetime Risk: 3.3  CLINICAL BREAST EXAM: The patient reports their last clinical breast exam was performed within the past year.  COMPARISON STUDIES: The present examination has been compared to prior imaging studies performed at [facility] on 07/14/2021 and 07/22/2021, and at [facility] on 11/30/2021 and 07/14/2022.  MAMMOGRAM FINDINGS: Mammography was performed including the following views: bilateral craniocaudal with tomosynthesis, bilateral mediolateral oblique with tomosynthesis.  The examination includes digital synthetic 2D and digital tomosynthesis 3D images. Additional imaging analysis was performed using CAD (computer-aided detection) software.  There are scattered areas of fibroglandular density.  Finding 1:  There are stable asymmetries seen in both breasts.  ULTRASOUND FINDINGS: Finding 2:  Bilateral whole breast ultrasound was performed. There is no evidence of any solid mass or abnormal cystic elements.  IMPRESSION: Finding 1:  Stable asymmetries in both breasts are benign finding.  Finding 2:  There is no evidence of malignancy.  RECOMMENDATION: Unless otherwise indicated by clinical findings, annual screening mammography recommended.  ASSESSMENT: BI-RADS Category 2:  Benign

## 2024-09-16 ENCOUNTER — RESULT REVIEW (OUTPATIENT)
Age: 69
End: 2024-09-16

## 2024-09-16 ENCOUNTER — OUTPATIENT (OUTPATIENT)
Dept: OUTPATIENT SERVICES | Facility: HOSPITAL | Age: 69
LOS: 1 days | End: 2024-09-16
Payer: MEDICARE

## 2024-09-16 DIAGNOSIS — N64.4 MASTODYNIA: ICD-10-CM

## 2024-09-16 DIAGNOSIS — Z00.8 ENCOUNTER FOR OTHER GENERAL EXAMINATION: ICD-10-CM

## 2024-09-16 PROCEDURE — 77066 DX MAMMO INCL CAD BI: CPT

## 2024-09-16 PROCEDURE — G0279: CPT | Mod: 26

## 2024-09-16 PROCEDURE — G0279: CPT

## 2024-09-16 PROCEDURE — 76641 ULTRASOUND BREAST COMPLETE: CPT | Mod: 26,RT

## 2024-09-16 PROCEDURE — 76641 ULTRASOUND BREAST COMPLETE: CPT | Mod: RT

## 2024-09-16 PROCEDURE — 77066 DX MAMMO INCL CAD BI: CPT | Mod: 26

## 2024-09-17 DIAGNOSIS — N64.4 MASTODYNIA: ICD-10-CM

## 2024-11-25 NOTE — BRIEF OPERATIVE NOTE - OPERATION/FINDINGS
Anesthesia Post Evaluation    Patient: Al Anna    Procedure(s) Performed: Procedure(s) (LRB):  LAPAROTOMY, EXPLORATORY (N/A)    Final Anesthesia Type: general      Patient location during evaluation: PACU  Patient participation: Yes- Able to Participate  Level of consciousness: awake and alert  Post-procedure vital signs: reviewed and stable  Pain management: adequate  Airway patency: patent    PONV status at discharge: No PONV  Anesthetic complications: no      Cardiovascular status: stable  Respiratory status: room air  Hydration status: euvolemic  Follow-up not needed.              Vitals Value Taken Time   /67 11/25/24 0711   Temp 36.9 °C (98.5 °F) 11/25/24 0711   Pulse 87 11/25/24 0711   Resp 18 11/25/24 0335   SpO2 95 % 11/25/24 0711         No case tracking events are documented in the log.      Pain/Darby Score: Pain Rating Prior to Med Admin: 2 (11/25/2024  6:05 AM)  Pain Rating Post Med Admin: 0 (11/24/2024  4:14 PM)           left breast mass